# Patient Record
Sex: MALE | Race: BLACK OR AFRICAN AMERICAN | NOT HISPANIC OR LATINO | URBAN - METROPOLITAN AREA
[De-identification: names, ages, dates, MRNs, and addresses within clinical notes are randomized per-mention and may not be internally consistent; named-entity substitution may affect disease eponyms.]

---

## 2022-04-19 ENCOUNTER — EMERGENCY (EMERGENCY)
Facility: HOSPITAL | Age: 27
LOS: 1 days | Discharge: HOME | End: 2022-04-19
Attending: EMERGENCY MEDICINE
Payer: COMMERCIAL

## 2022-04-19 VITALS
WEIGHT: 175.05 LBS | HEIGHT: 67 IN | HEART RATE: 78 BPM | SYSTOLIC BLOOD PRESSURE: 143 MMHG | TEMPERATURE: 98 F | OXYGEN SATURATION: 98 % | RESPIRATION RATE: 16 BRPM | DIASTOLIC BLOOD PRESSURE: 88 MMHG

## 2022-04-19 LAB
ALBUMIN SERPL ELPH-MCNC: 4.2 G/DL — SIGNIFICANT CHANGE UP (ref 3.3–5.2)
ALP SERPL-CCNC: 149 U/L — HIGH (ref 40–120)
ALT FLD-CCNC: 24 U/L — SIGNIFICANT CHANGE UP
ANION GAP SERPL CALC-SCNC: 10 MMOL/L — SIGNIFICANT CHANGE UP (ref 5–17)
AST SERPL-CCNC: 32 U/L — SIGNIFICANT CHANGE UP
BASOPHILS # BLD AUTO: 0.04 K/UL — SIGNIFICANT CHANGE UP (ref 0–0.2)
BASOPHILS NFR BLD AUTO: 0.5 % — SIGNIFICANT CHANGE UP (ref 0–2)
BILIRUB SERPL-MCNC: 0.3 MG/DL — LOW (ref 0.4–2)
BUN SERPL-MCNC: 15.7 MG/DL — SIGNIFICANT CHANGE UP (ref 8–20)
CALCIUM SERPL-MCNC: 9.2 MG/DL — SIGNIFICANT CHANGE UP (ref 8.6–10.2)
CHLORIDE SERPL-SCNC: 103 MMOL/L — SIGNIFICANT CHANGE UP (ref 98–107)
CO2 SERPL-SCNC: 25 MMOL/L — SIGNIFICANT CHANGE UP (ref 22–29)
CREAT SERPL-MCNC: 0.83 MG/DL — SIGNIFICANT CHANGE UP (ref 0.5–1.3)
D DIMER BLD IA.RAPID-MCNC: 214 NG/ML DDU — SIGNIFICANT CHANGE UP
EGFR: 125 ML/MIN/1.73M2 — SIGNIFICANT CHANGE UP
EOSINOPHIL # BLD AUTO: 0.23 K/UL — SIGNIFICANT CHANGE UP (ref 0–0.5)
EOSINOPHIL NFR BLD AUTO: 2.9 % — SIGNIFICANT CHANGE UP (ref 0–6)
FLUAV AG NPH QL: SIGNIFICANT CHANGE UP
FLUBV AG NPH QL: SIGNIFICANT CHANGE UP
GLUCOSE SERPL-MCNC: 89 MG/DL — SIGNIFICANT CHANGE UP (ref 70–99)
HCT VFR BLD CALC: 39.2 % — SIGNIFICANT CHANGE UP (ref 39–50)
HGB BLD-MCNC: 13.3 G/DL — SIGNIFICANT CHANGE UP (ref 13–17)
IMM GRANULOCYTES NFR BLD AUTO: 0.1 % — SIGNIFICANT CHANGE UP (ref 0–1.5)
LYMPHOCYTES # BLD AUTO: 2.82 K/UL — SIGNIFICANT CHANGE UP (ref 1–3.3)
LYMPHOCYTES # BLD AUTO: 35.1 % — SIGNIFICANT CHANGE UP (ref 13–44)
MCHC RBC-ENTMCNC: 27.8 PG — SIGNIFICANT CHANGE UP (ref 27–34)
MCHC RBC-ENTMCNC: 33.9 GM/DL — SIGNIFICANT CHANGE UP (ref 32–36)
MCV RBC AUTO: 82 FL — SIGNIFICANT CHANGE UP (ref 80–100)
MONOCYTES # BLD AUTO: 0.69 K/UL — SIGNIFICANT CHANGE UP (ref 0–0.9)
MONOCYTES NFR BLD AUTO: 8.6 % — SIGNIFICANT CHANGE UP (ref 2–14)
NEUTROPHILS # BLD AUTO: 4.25 K/UL — SIGNIFICANT CHANGE UP (ref 1.8–7.4)
NEUTROPHILS NFR BLD AUTO: 52.8 % — SIGNIFICANT CHANGE UP (ref 43–77)
NT-PROBNP SERPL-SCNC: 23 PG/ML — SIGNIFICANT CHANGE UP (ref 0–300)
PLATELET # BLD AUTO: 214 K/UL — SIGNIFICANT CHANGE UP (ref 150–400)
POTASSIUM SERPL-MCNC: 4.3 MMOL/L — SIGNIFICANT CHANGE UP (ref 3.5–5.3)
POTASSIUM SERPL-SCNC: 4.3 MMOL/L — SIGNIFICANT CHANGE UP (ref 3.5–5.3)
PROT SERPL-MCNC: 7.4 G/DL — SIGNIFICANT CHANGE UP (ref 6.6–8.7)
RBC # BLD: 4.78 M/UL — SIGNIFICANT CHANGE UP (ref 4.2–5.8)
RBC # FLD: 13.2 % — SIGNIFICANT CHANGE UP (ref 10.3–14.5)
RSV RNA NPH QL NAA+NON-PROBE: SIGNIFICANT CHANGE UP
SARS-COV-2 RNA SPEC QL NAA+PROBE: SIGNIFICANT CHANGE UP
SODIUM SERPL-SCNC: 138 MMOL/L — SIGNIFICANT CHANGE UP (ref 135–145)
TROPONIN T SERPL-MCNC: <0.01 NG/ML — SIGNIFICANT CHANGE UP (ref 0–0.06)
WBC # BLD: 8.04 K/UL — SIGNIFICANT CHANGE UP (ref 3.8–10.5)
WBC # FLD AUTO: 8.04 K/UL — SIGNIFICANT CHANGE UP (ref 3.8–10.5)

## 2022-04-19 PROCEDURE — 71046 X-RAY EXAM CHEST 2 VIEWS: CPT | Mod: 26

## 2022-04-19 PROCEDURE — 93010 ELECTROCARDIOGRAM REPORT: CPT

## 2022-04-19 PROCEDURE — 99220: CPT

## 2022-04-19 RX ORDER — ACETAMINOPHEN 500 MG
650 TABLET ORAL EVERY 6 HOURS
Refills: 0 | Status: DISCONTINUED | OUTPATIENT
Start: 2022-04-19 | End: 2022-04-24

## 2022-04-19 NOTE — ED PROVIDER NOTE - CLINICAL SUMMARY MEDICAL DECISION MAKING FREE TEXT BOX
ASSESSMENT:   AMY IBARRA is a 26yo M who presented with CHEST PAIN. W/o concerning history, physical. EKG showing possible LVH    Concerning for heart disease    PLAN: Trop, bnp, d-dimer.     Medications    Studies  Xray Chest 2 Views PA/Lat  Flu With COVID-19 By ARTUR  Troponin T, Serum  Complete Blood Count + Automated Diff  Comprehensive Metabolic Panel

## 2022-04-19 NOTE — ED PROVIDER NOTE - ATTENDING CONTRIBUTION TO CARE
I have personally performed a face to face medical and diagnostic evaluation of the patient. I have discussed with and reviewed the resident's note and agree with the History, ROS, Physical Exam and MDM unless otherwise indicated. A brief summary of my personal evaluation and impression can be found below.    26yo man no PMH/PSH presents with chest pressure that started this morning when he woke up. Patient denies palpitations, SOB, diaphoresis, n/v. No trauma, no rash, no strenuous activity. No long travel, recent hospitalizations or surgeries, leg swelling or pain, No fevers or cough. No family hx of cardiac disease.  Pt went to urgent care and was referred to [place] [facility]ology who referred pt to ED for further evaluation with blood work and TTE.    On exam, initial vitals were reviewed.  Pt is well-appearing in no acute distress. NC/AT, PERRL, MMM, supple neck, RRR, pulses 2+ in all extremities, no LE edema/swelling or tenderness to palpation, lungs CTABL, abdomen soft, nontender, nondistended, no obvious joint deformities, pt is awake and alert and moving all extremities without difficulty, no rash noted.     Will evaluate with blood work, cxr, and consider observation for TTE and cardiology consultation.

## 2022-04-19 NOTE — ED ADULT NURSE NOTE - ED STAT RN HANDOFF DETAILS
Report given to Spencer Arreola RN  Patient brought over to OBS and placed on portable cardiac monitor.   Patient in no acute distress at time of transfer to OBS

## 2022-04-19 NOTE — ED CDU PROVIDER INITIAL DAY NOTE - NS ED ATTENDING STATEMENT MOD
This was a shared visit with the NORMAN. I reviewed and verified the documentation and independently performed the documented:

## 2022-04-19 NOTE — ED ADULT TRIAGE NOTE - CHIEF COMPLAINT QUOTE
Since this morning patient has had pain in the center of his chest "like someone is sitting on it" and dizziness. Dizziness has improved throughout the day but chest pain is still there. Was seen at and sent in from [place] [facility]ology for dimer, trop and to be placed on OBS for TTE. Has LVH on EKG

## 2022-04-19 NOTE — ED CDU PROVIDER INITIAL DAY NOTE - ATTENDING APP SHARED VISIT CONTRIBUTION OF CARE
I agree with the PA's note and was available for any issues/concerns. I was directly involved in patient care. My brief overall assessment is as follows:    27 year old male no PMHx c/o chest pain; initial work up reviewed; admit to obs for cardiac work up

## 2022-04-19 NOTE — ED ADULT NURSE NOTE - CHIEF COMPLAINT QUOTE
Since this morning patient has had pain in the center of his chest "like someone is sitting on it" and dizziness. Dizziness has improved throughout the day but chest pain is still there. Was seen at and sent in from [place] [facility]ology for dimer, trop and to be placed on OBS for TTE. Has LVH on EKG

## 2022-04-19 NOTE — ED PROVIDER NOTE - NS ED ROS FT
Review of Systems  CONSTITUTIONAL: afebrile w/no diaphoresis or weight changes +DIZZY  SKIN: warm, dry w/ no rash or bleeding  EYES: no changes to vision  ENT: no changes in hearing, no sore throat  RESPIRATORY: no cough or SOB  CARDIAC: +CHEST PRESSURE  GI: no abd pain, nausea, vomiting, diarrhea, constipation, or blood in stool/abilio blood  GENITO-URINARY: no discharge, dysuria or hematuria,   MUSCULOSKELETAL:  no joint pain, swelling or redness  NEUROLOGIC: no weakness, headache, anesthesia or paresthesias  PSYCH: no anxiety, non suicidal, non homicidal, without hallucinations or depression

## 2022-04-19 NOTE — ED ADULT NURSE REASSESSMENT NOTE - NS ED NURSE REASSESS COMMENT FT1
Assumed care of pt at this time. Pt A&O x 4 comfortable, denies complaints at this time. Denies pain. [place] footwear. Bed locked and in lowest position. Call bell within reach. Able to make needs known. Will continue to monitor.

## 2022-04-19 NOTE — ED ADULT NURSE REASSESSMENT NOTE - NS ED NURSE REASSESS COMMENT FT1
Assumed care of patient at this time. Patient on cardiac monitor. VS recorded in EMR. Patient resting in bed, in no acute distress at this time.

## 2022-04-19 NOTE — ED CDU PROVIDER INITIAL DAY NOTE - OBJECTIVE STATEMENT
KEVIN IBARRA is a 26yo M with no PMH who presents c/o CHEST PAIN. States he woke up this morning with chest pressure, "I felt like something was sitting on my chest". The pressure was a/w dizziness. Pt states that pain was moderate, non radiating, constat, not made better or worse by anything.  Pt denies any associated n/v/sob. He also denies Fever/Chills/N/V/Abdominal pain/Diarrhea/Constipation, CP/SOB, HA/Changes to vision/Fatigue/Weakness, Urinary symptoms.

## 2022-04-19 NOTE — ED PROVIDER NOTE - OBJECTIVE STATEMENT
KEVIN IBARRA is a 28yo M with no PMH who presents c/o CHEST PAIN. States he woke up this morning with chest pressure, "I felt like something was sitting on my chest". The pressure was a/w dizziness. Pt denies any associated n/v/sob. He also denies Fever/Chills/N/V/Abdominal pain/Diarrhea/Constipation, CP/SOB, HA/Changes to vision/Fatigue/Weakness, Urinary symptoms.

## 2022-04-19 NOTE — ED ADULT NURSE NOTE - OBJECTIVE STATEMENT
pt arrives to ED with complaints of chest pressure states he feels like someone is sitting on his chest. no complaints of SOB just slight intercostal pain with short deep breaths. Labs pending.

## 2022-04-20 VITALS
SYSTOLIC BLOOD PRESSURE: 123 MMHG | DIASTOLIC BLOOD PRESSURE: 717 MMHG | OXYGEN SATURATION: 100 % | RESPIRATION RATE: 18 BRPM | TEMPERATURE: 98 F | HEART RATE: 70 BPM

## 2022-04-20 PROBLEM — Z00.00 ENCOUNTER FOR PREVENTIVE HEALTH EXAMINATION: Status: ACTIVE | Noted: 2022-04-20

## 2022-04-20 LAB — TROPONIN T SERPL-MCNC: <0.01 NG/ML — SIGNIFICANT CHANGE UP (ref 0–0.06)

## 2022-04-20 PROCEDURE — 87637 SARSCOV2&INF A&B&RSV AMP PRB: CPT

## 2022-04-20 PROCEDURE — 96374 THER/PROPH/DIAG INJ IV PUSH: CPT | Mod: XU

## 2022-04-20 PROCEDURE — 85025 COMPLETE CBC W/AUTO DIFF WBC: CPT

## 2022-04-20 PROCEDURE — 93005 ELECTROCARDIOGRAM TRACING: CPT

## 2022-04-20 PROCEDURE — G0378: CPT

## 2022-04-20 PROCEDURE — 99285 EMERGENCY DEPT VISIT HI MDM: CPT | Mod: 25

## 2022-04-20 PROCEDURE — 99217: CPT | Mod: FS

## 2022-04-20 PROCEDURE — 36415 COLL VENOUS BLD VENIPUNCTURE: CPT

## 2022-04-20 PROCEDURE — 71046 X-RAY EXAM CHEST 2 VIEWS: CPT

## 2022-04-20 PROCEDURE — 80053 COMPREHEN METABOLIC PANEL: CPT

## 2022-04-20 PROCEDURE — 85379 FIBRIN DEGRADATION QUANT: CPT

## 2022-04-20 PROCEDURE — 83880 ASSAY OF NATRIURETIC PEPTIDE: CPT

## 2022-04-20 PROCEDURE — 84484 ASSAY OF TROPONIN QUANT: CPT

## 2022-04-20 PROCEDURE — 93306 TTE W/DOPPLER COMPLETE: CPT

## 2022-04-20 RX ORDER — KETOROLAC TROMETHAMINE 30 MG/ML
30 SYRINGE (ML) INJECTION ONCE
Refills: 0 | Status: DISCONTINUED | OUTPATIENT
Start: 2022-04-20 | End: 2022-04-20

## 2022-04-20 RX ADMIN — Medication 650 MILLIGRAM(S): at 09:39

## 2022-04-20 RX ADMIN — Medication 650 MILLIGRAM(S): at 10:37

## 2022-04-20 RX ADMIN — Medication 30 MILLIGRAM(S): at 10:37

## 2022-04-20 RX ADMIN — Medication 30 MILLIGRAM(S): at 09:40

## 2022-04-20 NOTE — ED CDU PROVIDER DISPOSITION NOTE - NSFOLLOWUPINSTRUCTIONS_ED_ALL_ED_FT
Please follow up with cardiology and therapist as outpatient    May try Motrin as needed for chest wall inflammation    Return if symptoms worsen or persist

## 2022-04-20 NOTE — ED CDU PROVIDER DISPOSITION NOTE - ATTENDING APP SHARED VISIT CONTRIBUTION OF CARE
I, Nina Fournier MD have personally performed a face to face diagnostic evaluation on this patient.  I have reviewed the ACP note and agree with the history, exam, and plan of care, except as noted.     feeling much better no cp at this time. seen and cleared by cardiology; also notes being extremely stressed laterly +anxiety/depression denies si/hi; offered to be seen by psych for a possible panic attack? pt notes he feels much better and would like to follow up as an outpatient - wife at bedside agrees with plan will dc     Head: atraumatic, normacephalic  Face: atraumatic, no crepitus no orbiral/maxillary/mandibular ttp  throat: uvula midline no exudates  eyes: perrla eomi  heart: rrr s1s2  lungs: ctab  abd: soft, nt nd +bs no rebound/guarding no cva ttp  skin: warm  LE: no swelling, no calf ttp  back: no midline cervical/thoracic/lumbar ttp I, Nina Fournier MD have personally performed a face to face diagnostic evaluation on this patient.  I have reviewed the pa's note and agree with the history, exam, and plan of care, except as noted.     feeling much better no cp at this time. seen and cleared by cardiology; also notes being extremely stressed laterly +anxiety/depression denies si/hi; offered to be seen by psych for a possible panic attack? pt notes he feels much better and would like to follow up as an outpatient - wife at bedside agrees with plan will dc     Head: atraumatic, normacephalic  Face: atraumatic, no crepitus no orbiral/maxillary/mandibular ttp  throat: uvula midline no exudates  eyes: perrla eomi  heart: rrr s1s2  lungs: ctab  abd: soft, nt nd +bs no rebound/guarding no cva ttp  skin: warm  LE: no swelling, no calf ttp  back: no midline cervical/thoracic/lumbar ttp

## 2022-04-20 NOTE — CHART NOTE - NSCHARTNOTEFT_GEN_A_CORE
SOCIAL WORK NOTE:  MET WITH PATIENT BRIEFLY AS HE WAS REQUESTING RESOURCES TO TAKE HOME FOR OUTPATIENT MENTAL HEALTH ISSUES. PATIENT REPORTED TO MD THAT HE IS NOT SUICIDAL OR HOMICIDAL BUT OFTEN REPORTS FEELING OF ANXIETY AND DEPRESSION AT TIMES.  PATIENT'S SPOUSE AT BEDSIDE. REVIEWED THE LIST WITH HIM AND HE HAD NO FURTHER QUESTIONS AND WAS APPRECIATIVE OF LIST.

## 2022-04-20 NOTE — ED CDU PROVIDER DISPOSITION NOTE - CLINICAL COURSE
KEVIN IBARRA is a 26yo M with no PMH who presents c/o CHEST PAIN. States he woke up this morning with chest pressure, "I felt like something was sitting on my chest". The pressure was a/w dizziness. Pt denies any associated n/v/sob. He also denies Fever/Chills/N/V/Abdominal pain/Diarrhea/Constipation, CP/SOB, HA/Changes to vision/Fatigue/Weakness, Urinary symptoms.  Patient seen by cardiology, cleared for outpatient f/u. KEVIN IBARRA is a 26yo M with no PMH who presents c/o CHEST PAIN. States he woke up this morning with chest pressure, "I felt like something was sitting on my chest". The pressure was a/w dizziness. Pt denies any associated n/v/sob. He also denies Fever/Chills/N/V/Abdominal pain/Diarrhea/Constipation, CP/SOB, HA/Changes to vision/Fatigue/Weakness, Urinary symptoms.  Patient seen by cardiology, cleared for outpatient f/u.  Upon discharge patient expressed depression/anxiety due to family situations, not suicidal or homicidal.  Amendable for outpatient resources.  Social work called to provide outpatient resources.

## 2022-04-20 NOTE — ED CDU PROVIDER DISPOSITION NOTE - CARE PROVIDER_API CALL
Ace Casillas (DO)  Cardiovascular Disease; Internal Medicine  [address] [place] [address]  Phone: [phone]  Fax: [phone]  Follow Up Time:

## 2022-04-20 NOTE — CONSULT NOTE ADULT - SUBJECTIVE AND OBJECTIVE BOX
[facility]                                   [address] [place] [address]                                                      PHONE: [phone]                                                         FAX: [phone]  [email or web address]  ---------------------------------------------------------------------------------------------------------------------------------    Overnight events/patient complaints:  Pt sent from office for CP.  PT with some mid sternal reproducible chest pain with palpation    No Known Allergies    MEDICATIONS  (STANDING):    MEDICATIONS  (PRN):  acetaminophen     Tablet .. 650 milliGRAM(s) Oral every 6 hours PRN Mild Pain (1 - 3)      Vital Signs Last 24 Hrs  T(C): 36.8 (20 Apr 2022 08:27), Max: 36.9 (19 Apr 2022 16:38)  T(F): 98.3 (20 Apr 2022 08:27), Max: 98.5 (19 Apr 2022 16:38)  HR: 70 (20 Apr 2022 08:27) (70 - 88)  BP: 119/77 (20 Apr 2022 08:27) (109/65 - 143/88)  BP(mean): --  RR: 18 (20 Apr 2022 08:27) (14 - 18)  SpO2: 99% (20 Apr 2022 08:27) (97% - 100%)  ICU Vital Signs Last 24 Hrs  AMY IBARRA  I&O's Detail    Drug Dosing Weight  AMY IBARRA      PHYSICAL EXAM:  General:  alert and cooperative.  HEENT: Head; normocephalic, atraumatic.  Eyes: Pupils reactive, cornea wnl.  Neck: Supple, no nodes adenopathy, no NVD or carotid bruit or thyromegaly.  CARDIOVASCULAR: Normal S1 and S2, No murmur, rub, gallop or lift.   LUNGS: No rales, rhonchi or wheeze. Normal breath sounds bilaterally.  ABDOMEN: Soft, nontender without mass or organomegaly. bowel sounds normoactive.  EXTREMITIES: No clubbing, cyanosis or edema. Distal pulses wnl.   SKIN: warm and dry with normal turgor.  NEURO: Alert/oriented x 3/normal motor exam. No pathologic reflexes.    PSYCH: normal affect.        LABS:                        13.3   8.04  )-----------( 214      ( 19 Apr 2022 18:51 )             39.2     04-19    138  |  103  |  15.7  ----------------------------<  89  4.3   |  25.0  |  0.83    Ca    9.2      19 Apr 2022 18:51    TPro  7.4  /  Alb  4.2  /  TBili  0.3<L>  /  DBili  x   /  AST  32  /  ALT  24  /  AlkPhos  149<H>  04-19    AMY IBARRA  CARDIAC MARKERS ( 20 Apr 2022 03:05 )  x     / <0.01 ng/mL / x     / x     / x      CARDIAC MARKERS ( 19 Apr 2022 18:51 )  x     / <0.01 ng/mL / x     / x     / x                RADIOLOGY & ADDITIONAL STUDIES:    INTERPRETATION OF TELEMETRY (personally reviewed): no events    ECG: NS @ 77 LVH no acute ischemic changes     ECHO: < from: TTE Echo Complete w/o Contrast w/ Doppler (04.19.22 @ 21:12) >  Summary:   1. Left ventricular ejection fraction, by visual estimation, is 60 to   65%.   2. Normal global left ventricular systolic function.   3. Normal left atrial size.   4. Normal right atrial size.   5. Trace mitral valve regurgitation.   6. Mild tricuspid regurgitation.    MD Tushar Electronically signed on 4/20/2022 at 8:23:02   AM    < end of copied text >         ASSESSMENT AND PLAN:  In summary, AMY IBARRA is an 27y Male pw Chest pain.    1) Negative Trops, echo  2) D dimer normal  3) Discussed with pt in detail.  Will try NSAIDs for possible costochondritis   4) OK to DC home today

## 2022-04-20 NOTE — ED ADULT NURSE REASSESSMENT NOTE - NS ED NURSE REASSESS COMMENT FT1
Pt received from DANO Palmer and assumed care @ 0730hrs.   Patient is A&Ox4.  Pt denies any pain or discomfort.  Bilateral  Lung sounds Clear, No SOB, No LOC. PERRLA   IV is in place  20 gauge in right AC .  Pt stated he has not had a BM x 2 days.  Dr. Pinon was at the bedside and will confer with ED MD.  Vital signs within normal limits.  Pt stated that he has been depressed for years however, never spoke to his PCP about this.  Pt denied wanting to hurt himself or others.   Pt wife was at bedside.  Pt Plan of care explained and reviewed with patient. Pt received from DANO Palmer and assumed care @ 0730hrs.   Patient is A&Ox4.  Pt denies any pain or discomfort.  Bilateral  Lung sounds Clear, No SOB, No LOC. PERRLA   IV is in place  20 gauge in right AC .  Pt stated he has not had a BM x 2 days.  Dr. Pinon was at the bedside and will confer with ED MD.  Vital signs within normal limits.  Pt stated that he has been depressed for years however, never spoke to his PCP about this.  I advised ZEINAB Teran who will evaluate the patient.  Pt denied wanting to hurt himself or others.   Pt ecg strip provided to ZEINAB Teran.  Pt wife was at bedside.  Pt Plan of care explained and reviewed with patient.

## 2022-04-20 NOTE — ED CDU PROVIDER SUBSEQUENT DAY NOTE - HISTORY
PT placed in OBS, has had no acute incidents or complaints while in CDU PT is stable. PT Placed in OBS for evaluation of CP and abnormal EKG that was seen in out pt CARDs office.

## 2022-04-20 NOTE — ED CDU PROVIDER DISPOSITION NOTE - PATIENT PORTAL LINK FT
You can access the FollowMyHealth Patient Portal offered by [facility] by registering at the following website: [email or web address]. By joining [employer]’s FollowMyHealth portal, you will also be able to view your health information using other applications (apps) compatible with our system.

## 2023-06-05 NOTE — ED CDU PROVIDER SUBSEQUENT DAY NOTE - NSICDXFAMHXNEG_GEN_ED
Patient Recommendations:  - Increase losartan to 50 mg daily.  - Start amlodipine 5 mg nightly.  Recommend patient check blood pressure at home on a regular basis, such as once every week or two, and follow up with primary Nephrologist or PCP if above goal.  - Recommend weight loss for overall health by increasing exercise and watching caloric intake.   - Recommend routine follow up with Dr. Cobos in 3-4 months or so to resume care with a goal of ongoing co-management between your primary Nephrologist and the Transplant Team.   - Routine transplant labs in 10-14 days.    Transplant Patient Information  Your Post Transplant Coordinator is: Sweta Card  For non urgent items, we encourage you to contact your coordinator/care team online via [employer]  You and your care team can also contact your transplant coordinator Monday - Friday, 8am - 5pm at [phone] (Option 2 to reach the coordinator or Option 4 to schedule an appointment).  After hours for urgent matters, please call [facility] at [phone].   sudden card death./coronary disease/heart disease
